# Patient Record
Sex: MALE | Race: WHITE | NOT HISPANIC OR LATINO | Employment: OTHER | URBAN - METROPOLITAN AREA
[De-identification: names, ages, dates, MRNs, and addresses within clinical notes are randomized per-mention and may not be internally consistent; named-entity substitution may affect disease eponyms.]

---

## 2017-10-09 RX ORDER — TAMSULOSIN HYDROCHLORIDE 0.4 MG/1
CAPSULE ORAL
Qty: 180 CAPSULE | Refills: 3 | Status: SHIPPED | OUTPATIENT
Start: 2017-10-09 | End: 2018-10-30 | Stop reason: SDUPTHER

## 2018-10-30 DIAGNOSIS — N13.8 BPH WITH URINARY OBSTRUCTION: Primary | ICD-10-CM

## 2018-10-30 DIAGNOSIS — N40.1 BPH WITH URINARY OBSTRUCTION: Primary | ICD-10-CM

## 2018-10-31 RX ORDER — TAMSULOSIN HYDROCHLORIDE 0.4 MG/1
CAPSULE ORAL
Qty: 180 CAPSULE | Refills: 3 | Status: SHIPPED | OUTPATIENT
Start: 2018-10-31

## 2019-12-03 ENCOUNTER — HOSPITAL ENCOUNTER (OUTPATIENT)
Facility: HOSPITAL | Age: 84
Discharge: NURSING, REHAB OR LONG-TERM CARE | End: 2019-12-05
Attending: EMERGENCY MEDICINE | Admitting: INTERNAL MEDICINE
Payer: MEDICARE

## 2019-12-03 DIAGNOSIS — K22.2 ESOPHAGEAL STRICTURE: Primary | ICD-10-CM

## 2019-12-03 DIAGNOSIS — R13.10 DYSPHAGIA: ICD-10-CM

## 2019-12-03 PROBLEM — R03.0 ELEVATED BLOOD PRESSURE READING: Status: ACTIVE | Noted: 2019-12-03

## 2019-12-03 LAB
ALBUMIN SERPL BCP-MCNC: 4.1 G/DL (ref 3.5–5.2)
ALP SERPL-CCNC: 72 U/L (ref 55–135)
ALT SERPL W/O P-5'-P-CCNC: 15 U/L (ref 10–44)
ANION GAP SERPL CALC-SCNC: 9 MMOL/L (ref 8–16)
AST SERPL-CCNC: 22 U/L (ref 10–40)
BASOPHILS # BLD AUTO: 0.03 K/UL (ref 0–0.2)
BASOPHILS NFR BLD: 0.5 % (ref 0–1.9)
BILIRUB SERPL-MCNC: 0.8 MG/DL (ref 0.1–1)
BUN SERPL-MCNC: 24 MG/DL (ref 10–30)
CALCIUM SERPL-MCNC: 9.5 MG/DL (ref 8.7–10.5)
CHLORIDE SERPL-SCNC: 104 MMOL/L (ref 95–110)
CO2 SERPL-SCNC: 26 MMOL/L (ref 23–29)
CREAT SERPL-MCNC: 1.1 MG/DL (ref 0.5–1.4)
DIFFERENTIAL METHOD: ABNORMAL
EOSINOPHIL # BLD AUTO: 0.3 K/UL (ref 0–0.5)
EOSINOPHIL NFR BLD: 5 % (ref 0–8)
ERYTHROCYTE [DISTWIDTH] IN BLOOD BY AUTOMATED COUNT: 14.1 % (ref 11.5–14.5)
EST. GFR  (AFRICAN AMERICAN): >60 ML/MIN/1.73 M^2
EST. GFR  (NON AFRICAN AMERICAN): 57.6 ML/MIN/1.73 M^2
GLUCOSE SERPL-MCNC: 100 MG/DL (ref 70–110)
HCT VFR BLD AUTO: 43.4 % (ref 40–54)
HGB BLD-MCNC: 14.1 G/DL (ref 14–18)
IMM GRANULOCYTES # BLD AUTO: 0.04 K/UL (ref 0–0.04)
IMM GRANULOCYTES NFR BLD AUTO: 0.7 % (ref 0–0.5)
LYMPHOCYTES # BLD AUTO: 1.5 K/UL (ref 1–4.8)
LYMPHOCYTES NFR BLD: 25 % (ref 18–48)
MCH RBC QN AUTO: 30.7 PG (ref 27–31)
MCHC RBC AUTO-ENTMCNC: 32.5 G/DL (ref 32–36)
MCV RBC AUTO: 94 FL (ref 82–98)
MONOCYTES # BLD AUTO: 0.7 K/UL (ref 0.3–1)
MONOCYTES NFR BLD: 11.3 % (ref 4–15)
NEUTROPHILS # BLD AUTO: 3.5 K/UL (ref 1.8–7.7)
NEUTROPHILS NFR BLD: 57.5 % (ref 38–73)
NRBC BLD-RTO: 0 /100 WBC
PLATELET # BLD AUTO: 140 K/UL (ref 150–350)
PMV BLD AUTO: 10.7 FL (ref 9.2–12.9)
POTASSIUM SERPL-SCNC: 4.1 MMOL/L (ref 3.5–5.1)
PROT SERPL-MCNC: 7.1 G/DL (ref 6–8.4)
RBC # BLD AUTO: 4.6 M/UL (ref 4.6–6.2)
SODIUM SERPL-SCNC: 139 MMOL/L (ref 136–145)
WBC # BLD AUTO: 6 K/UL (ref 3.9–12.7)

## 2019-12-03 PROCEDURE — 99900035 HC TECH TIME PER 15 MIN (STAT)

## 2019-12-03 PROCEDURE — 94761 N-INVAS EAR/PLS OXIMETRY MLT: CPT

## 2019-12-03 PROCEDURE — 27000221 HC OXYGEN, UP TO 24 HOURS

## 2019-12-03 PROCEDURE — 80053 COMPREHEN METABOLIC PANEL: CPT

## 2019-12-03 PROCEDURE — 85025 COMPLETE CBC W/AUTO DIFF WBC: CPT

## 2019-12-03 PROCEDURE — 99285 EMERGENCY DEPT VISIT HI MDM: CPT | Mod: 25

## 2019-12-03 PROCEDURE — 93005 ELECTROCARDIOGRAM TRACING: CPT

## 2019-12-03 PROCEDURE — G0378 HOSPITAL OBSERVATION PER HR: HCPCS

## 2019-12-03 RX ORDER — GLUCAGON 1 MG
1 KIT INJECTION
Status: DISCONTINUED | OUTPATIENT
Start: 2019-12-03 | End: 2019-12-05 | Stop reason: HOSPADM

## 2019-12-03 RX ORDER — PRIMIDONE 50 MG/1
TABLET ORAL
COMMUNITY
Start: 2018-02-28 | End: 2019-12-03

## 2019-12-03 RX ORDER — CLONAZEPAM 0.5 MG/1
0.5 TABLET ORAL 2 TIMES DAILY PRN
COMMUNITY
Start: 2019-09-23

## 2019-12-03 RX ORDER — TAMSULOSIN HYDROCHLORIDE 0.4 MG/1
0.8 CAPSULE ORAL
COMMUNITY
Start: 2016-08-29 | End: 2019-12-03 | Stop reason: SDUPTHER

## 2019-12-03 RX ORDER — OMEPRAZOLE 40 MG/1
40 CAPSULE, DELAYED RELEASE ORAL NIGHTLY
Status: ON HOLD | COMMUNITY
Start: 2019-09-12 | End: 2019-12-04 | Stop reason: SDUPTHER

## 2019-12-03 RX ORDER — IBUPROFEN 200 MG
16 TABLET ORAL
Status: DISCONTINUED | OUTPATIENT
Start: 2019-12-03 | End: 2019-12-05 | Stop reason: HOSPADM

## 2019-12-03 RX ORDER — HYDRALAZINE HYDROCHLORIDE 20 MG/ML
10 INJECTION INTRAMUSCULAR; INTRAVENOUS EVERY 8 HOURS PRN
Status: DISCONTINUED | OUTPATIENT
Start: 2019-12-03 | End: 2019-12-05 | Stop reason: HOSPADM

## 2019-12-03 RX ORDER — IBUPROFEN 200 MG
24 TABLET ORAL
Status: DISCONTINUED | OUTPATIENT
Start: 2019-12-03 | End: 2019-12-05 | Stop reason: HOSPADM

## 2019-12-03 RX ORDER — PANTOPRAZOLE SODIUM 40 MG/10ML
40 INJECTION, POWDER, LYOPHILIZED, FOR SOLUTION INTRAVENOUS DAILY
Status: DISCONTINUED | OUTPATIENT
Start: 2019-12-04 | End: 2019-12-05 | Stop reason: HOSPADM

## 2019-12-03 RX ORDER — SODIUM CHLORIDE 0.9 % (FLUSH) 0.9 %
10 SYRINGE (ML) INJECTION
Status: DISCONTINUED | OUTPATIENT
Start: 2019-12-03 | End: 2019-12-05 | Stop reason: HOSPADM

## 2019-12-03 NOTE — ED TRIAGE NOTES
Pt was schedule for outpatient EGD and Dilation on Thursday. Pt was told to come to ER if he felt like he could not wait. Pt states that he feels like something is in his throat and he is unable to take in anything PO.

## 2019-12-03 NOTE — HOSPITAL COURSE
In the ER cbc is wnl, chemistry is pending  CXR is normal  BP elevated- hydralazine IVP is ordered

## 2019-12-03 NOTE — PROGRESS NOTES
Pt in the ER with dysphagia and dehydration.  Pt to see me on Thursday but given ongoing issues and dehydration I have advised admission and egd tomorrow with mac.  Please keep npo p mn for scope tomorrow am

## 2019-12-03 NOTE — ED NOTES
Patient identifiers for Jd Severino checked and correct.  LOC: Patient is awake, alert, and aware of environment with an appropriate affect. Patient is oriented x 3 and state he has a problem with his speech which is chronic.  APPEARANCE: Patient resting comfortably and in no acute distress. Patient is clean and well groomed, patient's clothing is properly fastened.  SKIN: The skin is warm and dry. Patient has poor skin turgor. Skin is intact; no bruising or breakdown noted.  MUSKULOSKELETAL: Patient is moving all extremities well, no obvious deformities noted. Pulses intact. States walks without assistance most of the time , caregiver states pt still drives a vehicle.  RESPIRATORY: Airway is open and patent. Respirations are spontaneous and non-labored with normal effort and rate.  CARDIAC: Patient has a normal rate and rhythm. No peripheral edema noted. Capillary refill < 3 seconds.  ABDOMEN: No distention noted. Bowel sounds active in all 4 quadrants. Soft and non-tender upon palpation.  NEUROLOGICAL: PERRL. Facial expression is symmetrical. Hand grasps are equal bilaterally. Normal sensation in all extremities when touched with finger  States is having difficulty with swallowing , worsening today.  Allergies reported:   Review of patient's allergies indicates:   Allergen Reactions    Corticosteroids (glucocorticoids) Other (See Comments)     Dex prashant...weakness. Unable to urinate

## 2019-12-03 NOTE — ED PROVIDER NOTES
Encounter Date: 12/3/2019       History     Chief Complaint   Patient presents with    EGD     pt here for emergent EGD and Dilation    Foreign Body In Throat     89-year-old male who has a prior history of lung cancer, GERD, prostatic hypertrophy, is sent to the emergency room from Dr. Durham's office  with complaints of difficulty swallowing.  Patient was scheduled to have had and EGD and esophageal dilatation in 2 days but was advised that if his symptoms became much worse to report to this emergency room today for an earlier procedure.  The patient had told nursing personnel that he had an inability to handle his own secretions.  Patient has a history of some vocal cord problems for review years and has difficulty with some inspiratory and expiratory grunting with respiration.  No history of any chest pain. He had no history of any weight loss.  No complaints of any changes in ventilatory status.  No complaints of any thoracic back pain. No fever or chills.        Review of patient's allergies indicates:   Allergen Reactions    Corticosteroids (glucocorticoids) Other (See Comments)     Dex prashant...weakness. Unable to urinate     Past Medical History:   Diagnosis Date    GERD (gastroesophageal reflux disease)     History of elevated PSA     Hypertrophy of prostate with urinary obstruction and other lower urinary tract symptoms (LUTS)     Immune disorder     Retention of urine, unspecified     Vocal cord anomaly      No past surgical history on file.  Family History   Problem Relation Age of Onset    Lung cancer Father      Social History     Tobacco Use    Smoking status: Never Smoker   Substance Use Topics    Alcohol use: Not on file    Drug use: Not on file     Review of Systems   Constitutional: Positive for appetite change. Negative for chills, diaphoresis, fatigue and fever.   HENT: Positive for trouble swallowing. Negative for congestion, drooling, ear pain, mouth sores, rhinorrhea, sinus pain  and sore throat.    Eyes: Negative for photophobia, pain and redness.   Respiratory: Negative for cough, chest tightness, shortness of breath, wheezing and stridor.    Cardiovascular: Negative for chest pain and palpitations.   Gastrointestinal: Negative for abdominal pain, constipation, diarrhea, nausea and vomiting.   Genitourinary: Negative for difficulty urinating, dysuria, flank pain, frequency and hematuria.   Musculoskeletal: Negative for arthralgias, back pain and neck pain.   Skin: Negative for pallor, rash and wound.   Neurological: Negative for headaches.   All other systems reviewed and are negative.      Physical Exam     Initial Vitals [12/03/19 1441]   BP Pulse Resp Temp SpO2   (!) 177/82 89 (!) 24 98.8 °F (37.1 °C) 95 %      MAP       --         Physical Exam    Constitutional: He appears well-developed and well-nourished. He is not diaphoretic. No distress.   HENT:   Head: Normocephalic and atraumatic.   Nose: Nose normal.   Mouth/Throat: Oropharynx is clear and moist.   Eyes: Conjunctivae and EOM are normal. Pupils are equal, round, and reactive to light. Right eye exhibits no discharge. Left eye exhibits no discharge. No scleral icterus.   Neck: Normal range of motion. Neck supple. No tracheal deviation present. No JVD present.   Cardiovascular: Normal rate, regular rhythm, normal heart sounds and intact distal pulses. Exam reveals no gallop and no friction rub.    No murmur heard.  Pulmonary/Chest: Breath sounds normal. No respiratory distress. He has no wheezes. He has no rhonchi. He has no rales.   Abdominal: Soft. Bowel sounds are normal. He exhibits no distension. There is no tenderness. There is no rebound and no guarding.   Musculoskeletal: Normal range of motion. He exhibits no edema or tenderness.   Lymphadenopathy:     He has no cervical adenopathy.   Neurological: He is alert and oriented to person, place, and time. He has normal strength. No cranial nerve deficit or sensory deficit.  GCS score is 15. GCS eye subscore is 4. GCS verbal subscore is 5. GCS motor subscore is 6.   Skin: Skin is warm and dry. Capillary refill takes less than 2 seconds. No rash noted. No erythema. No pallor.   Psychiatric: He has a normal mood and affect. His behavior is normal. Judgment and thought content normal.         ED Course   Procedures  Labs Reviewed   CBC W/ AUTO DIFFERENTIAL   COMPREHENSIVE METABOLIC PANEL          Imaging Results          X-Ray Chest AP Portable (In process)                               Attending Attestation:             Attending ED Notes:   This 89-year-old male who presented with some dysphagia, at the present time is handling his own secretions.  He was given a small amount of water to sip see whether not he was able to tolerate drinking at and he did so but with extreme difficulty.  During the ED course I did speak to Dr. Henriquez who recommended the patient be admitted now for an EGD in the a.m.                        Clinical Impression:       ICD-10-CM ICD-9-CM   1. Dysphagia R13.10 787.20   2.  Esophageal stricture                          Jasvir Gregg Jr., MD  12/03/19 0614

## 2019-12-03 NOTE — HPI
Patient presented to the ED with complaint of inability to manage his own oral secretions.  He was seen in the office by gastroenterologist who had scheduled him for esophageal dilation this week in the next two days  However he was not able to tolerate swallowing his own secretions and presented to the ER  He was evaluated in the ED by the gastroenterologist who recommended patient be admitted for EGD in am  The patient states that he comes from an [facility] tonight because he was having pain on swallowing and could not even pass liquids  He has a history of vocal cord dysfunction which causes him to have to gasp for air; he has had this as a result of some neurologic dysfunction from many years ago.  He previously had dilation a few years ago once by dr. Ochoa  Denied choking but feels he is not sure if food went down the wrong way    ROS: a ten point ros with pertinent positives and negatives in hpi, otherwise negative    Past Medical History:   Diagnosis Date    GERD (gastroesophageal reflux disease)     History of elevated PSA     Hypertrophy of prostate with urinary obstruction and other lower urinary tract symptoms (LUTS)     Immune disorder     Retention of urine, unspecified     Vocal cord anomaly       PAST SURGICAL HISTORY: esophageal dilation by Dr. Ochoa    Social History     Socioeconomic History    Marital status:      Spouse name: Not on file    Number of children: Not on file    Years of education: Not on file    Highest education level: Not on file   Occupational History    Not on file   Social Needs    Financial resource strain: Not on file    Food insecurity:     Worry: Not on file     Inability: Not on file    Transportation needs:     Medical: Not on file     Non-medical: Not on file   Tobacco Use    Smoking status: Never Smoker   Substance and Sexual Activity    Alcohol use: Not on file    Drug use: Not on file    Sexual activity: Not on file   Lifestyle    Physical  activity:     Days per week: Not on file     Minutes per session: Not on file    Stress: Not on file   Relationships    Social connections:     Talks on phone: Not on file     Gets together: Not on file     Attends Anglican service: Not on file     Active member of club or organization: Not on file     Attends meetings of clubs or organizations: Not on file     Relationship status: Not on file   Other Topics Concern    Not on file   Social History Narrative    Not on file     Family History   Problem Relation Age of Onset    Lung cancer Father

## 2019-12-03 NOTE — H&P
[facility] Medicine  History & Physical    Patient Name: Jd Severino  MRN: [id number]  Admission Date: 12/3/2019  Attending Physician: Efraín Lloyd MD    Primary Care Provider: Kieran Durham MD         Patient information was obtained from patient and ER records.     Subjective:     Principal Problem:difficulty swallowing own secretions    Chief Complaint:   Chief Complaint   Patient presents with    EGD     pt here for emergent EGD and Dilation    Foreign Body In Throat        HPI:   Patient presented to the ED with complaint of inability to manage his own oral secretions.  He was seen in the office by gastroenterologist who had scheduled him for esophageal dilation this week in the next two days  However he was not able to tolerate swallowing his own secretions and presented to the ER  He was evaluated in the ED by the gastroenterologist who recommended patient be admitted for EGD in am  The patient states that he comes from an [facility] tonight because he was having pain on swallowing and could not even pass liquids  He has a history of vocal cord dysfunction which causes him to have to gasp for air; he has had this as a result of some neurologic dysfunction from many years ago.  He previously had dilation a few years ago once by dr. Ochoa  Denied choking but feels he is not sure if food went down the wrong way    ROS: a ten point ros with pertinent positives and negatives in hpi, otherwise negative    Past Medical History:   Diagnosis Date    GERD (gastroesophageal reflux disease)     History of elevated PSA     Hypertrophy of prostate with urinary obstruction and other lower urinary tract symptoms (LUTS)     Immune disorder     Retention of urine, unspecified     Vocal cord anomaly       PAST SURGICAL HISTORY: esophageal dilation by Dr. Ochoa    Social History     Socioeconomic History    Marital status:      Spouse name: Not on file    Number of children: Not  on file    Years of education: Not on file    Highest education level: Not on file   Occupational History    Not on file   Social Needs    Financial resource strain: Not on file    Food insecurity:     Worry: Not on file     Inability: Not on file    Transportation needs:     Medical: Not on file     Non-medical: Not on file   Tobacco Use    Smoking status: Never Smoker   Substance and Sexual Activity    Alcohol use: Not on file    Drug use: Not on file    Sexual activity: Not on file   Lifestyle    Physical activity:     Days per week: Not on file     Minutes per session: Not on file    Stress: Not on file   Relationships    Social connections:     Talks on phone: Not on file     Gets together: Not on file     Attends Oriental orthodox service: Not on file     Active member of club or organization: Not on file     Attends meetings of clubs or organizations: Not on file     Relationship status: Not on file   Other Topics Concern    Not on file   Social History Narrative    Not on file     Family History   Problem Relation Age of Onset    Lung cancer Father          Vitals:    12/03/19 1630   BP: (!) 189/86   Pulse: 81   Resp: (!) 24   Temp:    oxygen saturation 92-98% on room air     Gen: ao, nad  HEENT: ncat; inspiratory stridor with stressed phonation on inspiration at baseline; lips look slightly dusky  CAR: nl s1s2  LUNGS: ctabl  ABD: ntnd   EXT: nl rom throughout  SKIN: warm+dry  NEURO: cn 2-12 grossly intact    Assessment/Plan:     Elevated blood pressure reading  Hydralazine prn  Monitor bp  Is not on bp medication   Needs outpatient evaluation      Esophageal stricture  Npo  EGD in am  GI consulted  - possible dc post procedure  - monitor on pulse ox      Vocal cord palsy  - monitor respiratory status      BPH (benign prostatic hypertrophy) with urinary obstruction  Monitor for urine retention      VTE Risk Mitigation (From admission, onward)         Ordered     Place ORLANDO hose  Until discontinued       12/03/19 1719     IP VTE HIGH RISK PATIENT  Once      12/03/19 1719                   Efraín Lloyd MD  Department of Hospital Medicine   [facility]

## 2019-12-04 ENCOUNTER — ANESTHESIA (OUTPATIENT)
Dept: SURGERY | Facility: HOSPITAL | Age: 84
End: 2019-12-04
Payer: MEDICARE

## 2019-12-04 ENCOUNTER — ANESTHESIA EVENT (OUTPATIENT)
Dept: SURGERY | Facility: HOSPITAL | Age: 84
End: 2019-12-04
Payer: MEDICARE

## 2019-12-04 PROBLEM — R03.0 ELEVATED BLOOD PRESSURE READING: Status: RESOLVED | Noted: 2019-12-03 | Resolved: 2019-12-04

## 2019-12-04 PROBLEM — K22.2 ESOPHAGEAL STRICTURE: Status: RESOLVED | Noted: 2019-12-03 | Resolved: 2019-12-04

## 2019-12-04 PROCEDURE — 37000008 HC ANESTHESIA 1ST 15 MINUTES: Performed by: INTERNAL MEDICINE

## 2019-12-04 PROCEDURE — 43239 EGD BIOPSY SINGLE/MULTIPLE: CPT | Mod: 59 | Performed by: INTERNAL MEDICINE

## 2019-12-04 PROCEDURE — 63600175 PHARM REV CODE 636 W HCPCS: Performed by: NURSE ANESTHETIST, CERTIFIED REGISTERED

## 2019-12-04 PROCEDURE — 88312 SPECIAL STAINS GROUP 1: CPT | Mod: TC

## 2019-12-04 PROCEDURE — 27000014 HC INFLATION DEVICE: Performed by: INTERNAL MEDICINE

## 2019-12-04 PROCEDURE — 88305 TISSUE EXAM BY PATHOLOGIST: CPT | Mod: TC,59

## 2019-12-04 PROCEDURE — 37000009 HC ANESTHESIA EA ADD 15 MINS: Performed by: INTERNAL MEDICINE

## 2019-12-04 PROCEDURE — 94761 N-INVAS EAR/PLS OXIMETRY MLT: CPT

## 2019-12-04 PROCEDURE — 27200043 HC FORCEPS, BIOPSY: Performed by: INTERNAL MEDICINE

## 2019-12-04 PROCEDURE — G0378 HOSPITAL OBSERVATION PER HR: HCPCS

## 2019-12-04 PROCEDURE — C1726 CATH, BAL DIL, NON-VASCULAR: HCPCS | Performed by: INTERNAL MEDICINE

## 2019-12-04 PROCEDURE — 43249 ESOPH EGD DILATION <30 MM: CPT | Performed by: INTERNAL MEDICINE

## 2019-12-04 PROCEDURE — 25000003 PHARM REV CODE 250: Performed by: INTERNAL MEDICINE

## 2019-12-04 RX ORDER — PROPOFOL 10 MG/ML
VIAL (ML) INTRAVENOUS
Status: DISCONTINUED | OUTPATIENT
Start: 2019-12-04 | End: 2019-12-04

## 2019-12-04 RX ORDER — AMLODIPINE BESYLATE 5 MG/1
5 TABLET ORAL DAILY
Qty: 30 TABLET | Refills: 0 | Status: SHIPPED | OUTPATIENT
Start: 2019-12-04

## 2019-12-04 RX ORDER — SUCRALFATE 1 G/10ML
1 SUSPENSION ORAL EVERY 6 HOURS
Status: DISCONTINUED | OUTPATIENT
Start: 2019-12-04 | End: 2019-12-05 | Stop reason: HOSPADM

## 2019-12-04 RX ORDER — OMEPRAZOLE 40 MG/1
40 CAPSULE, DELAYED RELEASE ORAL
Qty: 60 CAPSULE | Refills: 0 | Status: SHIPPED | OUTPATIENT
Start: 2019-12-04

## 2019-12-04 RX ORDER — CLONAZEPAM 0.5 MG/1
0.5 TABLET ORAL 2 TIMES DAILY PRN
Status: DISCONTINUED | OUTPATIENT
Start: 2019-12-04 | End: 2019-12-05 | Stop reason: HOSPADM

## 2019-12-04 RX ORDER — SODIUM CHLORIDE 9 MG/ML
INJECTION, SOLUTION INTRAVENOUS CONTINUOUS PRN
Status: DISCONTINUED | OUTPATIENT
Start: 2019-12-04 | End: 2019-12-04

## 2019-12-04 RX ORDER — SUCRALFATE 1 G/10ML
1 SUSPENSION ORAL EVERY 6 HOURS
Qty: 414 ML | Refills: 0 | Status: SHIPPED | OUTPATIENT
Start: 2019-12-04

## 2019-12-04 RX ADMIN — PROPOFOL 10 MG: 10 INJECTION, EMULSION INTRAVENOUS at 10:12

## 2019-12-04 RX ADMIN — PROPOFOL 5 MG: 10 INJECTION, EMULSION INTRAVENOUS at 10:12

## 2019-12-04 RX ADMIN — SODIUM CHLORIDE: 900 INJECTION, SOLUTION INTRAVENOUS at 10:12

## 2019-12-04 RX ADMIN — PROPOFOL 15 MG: 10 INJECTION, EMULSION INTRAVENOUS at 10:12

## 2019-12-04 RX ADMIN — SUCRALFATE 1 G: 1 SUSPENSION ORAL at 06:12

## 2019-12-04 NOTE — PROGRESS NOTES
"[facility] Medicine  Progress Note    Patient Name: Jd Severino  MRN: [id number]  Patient Class: OP- Observation   Admission Date: 12/3/2019  Attending Physician: Yvonne Hollis MD  Primary Care Provider: Kieran Durham MD    Subjective:     Principal Problem:Esophageal stricture    HPI:    Patient presented to the ED with complaint of inability to manage his own oral secretions.  He was seen in the office by gastroenterologist who had scheduled him for esophageal dilation this week in the next two days  However he was not able to tolerate swallowing his own secretions and presented to the ER  He was evaluated in the ED by the gastroenterologist who recommended patient be admitted for EGD in am  The patient states [facility] because he was having pain on swallowing and could not even pass liquids  He has a history of vocal cord dysfunction which causes him to have to gasp for air; he has had this as a result of some neurologic dysfunction from many years ago.  He previously had dilation a few years ago once by dr. Ochoa  Denied choking but feels he is not sure if food went down the wrong way    ROS: a ten point ros with pertinent positives and negatives in hpi, otherwise negative    Interval History:    12/4: pt seen after EGD with dilation.  Pt reports his breathing is not back to baseline after procedure today.  Nursing reports pt having episodes of confusion, and now having repeated episodes of dysphagia.  Nursing reports that pt hasn't been getting soft diet as per GI recs but has been receiving regular food.  Discharge held because of these issues developing.       Review of Systems     All systems reviewed and are negative except as noted per above.    Objective:      BP (!) 156/76   Pulse 79   Temp 98 °F (36.7 °C) (Oral)   Resp 20   Ht 6' 4" (1.93 m)   Wt 84.6 kg (186 lb 8.2 oz)   SpO2 97%   BMI 22.70 kg/m²     Physical Exam  Gen: alert, " responsive during my exam  HEENT:  Eyes - no pallor  External ears with no lesions  Nares patent  Mouth - lips chapped  CV: RRR  Lungs: CTA B/L, chronic stridor 2/2 vocal cord palsy   Abd: +BS, soft, NT, ND  Ext: no atrophy or edema  Skin: warm, dry  Neuro: grossly intact  Psych: pleasant    All labs, images, and other studies reviewed personally by me.      Assessment/Plan:      Patient Active Problem List   Diagnosis    BPH (benign prostatic hypertrophy) with urinary obstruction    Abnormal PSA    Mucous membrane pemphigoid    Spasmodic dysphonia    Benign prostatic hyperplasia    Essential tremor    Gastroesophageal reflux disease    Multiple pulmonary nodules    Vocal cord palsy     Esophageal stricture  - EGD today  - pt with continued SOB after procedure  - pt with continued confusion after procedure  - holding discharge for now  - pt wasn't given soft diet after procedure and reports repeat dysphagia  - pt made NPO for now with soft diet to resume in AM  - GI following, thank you    Vocal cord palsy with chronic stridor  - continuous pulse oximetry    BPH (benign prostatic hypertrophy) with urinary obstruction  Monitor for urine retention    Other chronic conditions: home Rx meds    VTE Risk Mitigation (From admission, onward)         Ordered     Place [place] hose  Until discontinued      12/03/19 1719     IP VTE HIGH RISK PATIENT  Once      12/03/19 1719                Yvonne Hollis MD  Department of Hospital Medicine   [facility]

## 2019-12-04 NOTE — SUBJECTIVE & OBJECTIVE
Interval History:       Review of Systems     All systems reviewed and are negative except as noted per above.    Objective:        Physical Exam

## 2019-12-04 NOTE — DISCHARGE INSTRUCTIONS
ONLY EAT SOFT FOODS FOR THE NEXT WEEK.  PLEASE TAKE NEW MEDICATIONS AS PRESCRIBED -- SEE YOUR MEDICATION LIST.

## 2019-12-04 NOTE — OP NOTE
EGD  Schatzki ring dilated to 20mmHg, biopsies of ring.   Erosive ulcerated esophagitis    Recs  -soft diet x 1 week  -ppi bid x 4 weeks  -carafate suspension qid x 2 weeks  -follow up in clinic in 3-4 weeks  -okay with d/c home today

## 2019-12-04 NOTE — ANESTHESIA PREPROCEDURE EVALUATION
12/04/2019  Jd Severino is a 89 y.o., male.    Patient Active Problem List   Diagnosis    BPH (benign prostatic hypertrophy) with urinary obstruction    Abnormal PSA    Mucous membrane pemphigoid    Spasmodic dysphonia    Benign prostatic hyperplasia    Essential tremor    Gastroesophageal reflux disease    Multiple pulmonary nodules    Vocal cord palsy    Esophageal stricture    Elevated blood pressure reading       History reviewed. No pertinent surgical history.     Tobacco Use:  The patient  reports that he has never smoked. He does not have any smokeless tobacco history on file.     Results for orders placed or performed during the hospital encounter of 12/03/19   EKG 12-lead    Collection Time: 12/03/19  6:18 PM    Narrative    Test Reason : R13.10,    Vent. Rate : 080 BPM     Atrial Rate : 080 BPM     P-R Int : 230 ms          QRS Dur : 082 ms      QT Int : 392 ms       P-R-T Axes : 051 058 069 degrees     QTc Int : 452 ms    Sinus rhythm with 1st degree A-V block with Premature supraventricular  complexes  Otherwise normal ECG  No previous ECGs available  Confirmed by Shahzad Damon MD ([id number]) on 12/4/2019 9:00:54 AM    Referred By: AKIRA   SELF           Confirmed By:Shahzad Damon MD        Imaging Results          X-Ray Chest AP Portable (Final result)  Result time 12/03/19 17:11:17    Final result by Shiv Gomes MD (12/03/19 17:11:17)                 Impression:      1. No acute radiographic abnormalities.      Electronically signed by: Shiv Gomes MD  Date:    12/03/2019  Time:    17:11             Narrative:    EXAMINATION:  XR CHEST AP PORTABLE    CLINICAL HISTORY:  Foreign body    COMPARISON:  None.    FINDINGS:  Heart size is normal.  The mediastinum is unremarkable.  No infiltrates or effusions are identified.  Calcified granulomas are noted at both  lung bases.    No radiopaque ingested foreign bodies are identified.                                 Lab Results   Component Value Date    WBC 6.00 12/03/2019    HGB 14.1 12/03/2019    HCT 43.4 12/03/2019    MCV 94 12/03/2019     (L) 12/03/2019     BMP  Lab Results   Component Value Date     12/03/2019    K 4.1 12/03/2019     12/03/2019    CO2 26 12/03/2019    BUN 24 12/03/2019    CREATININE 1.1 12/03/2019    CALCIUM 9.5 12/03/2019    ANIONGAP 9 12/03/2019    ESTGFRAFRICA >60.0 12/03/2019    EGFRNONAA 57.6 (A) 12/03/2019             Anesthesia Evaluation    I have reviewed the Patient Summary Reports.     I have reviewed the Medications.     Review of Systems  Anesthesia Hx:  No problems with previous Anesthesia Denies Hx of Anesthetic complications  Denies Family Hx of Anesthesia complications.   Denies Personal Hx of Anesthesia complications.   Hematology/Oncology:  Hematology Normal   Oncology Normal     EENT/Dental:EENT/Dental Normal   Cardiovascular:  Cardiovascular Normal     Pulmonary:  Pulmonary Normal    Renal/:  Renal/ Normal     Hepatic/GI:   GERD    Musculoskeletal:  Musculoskeletal Normal    Neurological:  Neurology Normal    Endocrine:  Endocrine Normal    Psych:  Psychiatric Normal           Physical Exam  General:  Well nourished    Airway/Jaw/Neck:  Airway Findings: Mouth Opening: Normal Tongue: Normal  General Airway Assessment: Adult  Mallampati: II  TM Distance: Normal, at least 6 cm  Jaw/Neck Findings:  Neck ROM: Normal ROM      Dental:  Dental Findings:   Chest/Lungs:  Chest/Lungs Findings: Clear to auscultation, Normal Respiratory Rate     Heart/Vascular:  Heart Findings: Rate: Normal  Rhythm: Regular Rhythm  Sounds: Normal        Mental Status:  Mental Status Findings:  Alert and Oriented         Anesthesia Plan  Type of Anesthesia, risks & benefits discussed:  Anesthesia Type:  MAC  Patient's Preference:   Intra-op Monitoring Plan: standard ASA monitors  Intra-op  Monitoring Plan Comments:   Post Op Pain Control Plan:   Post Op Pain Control Plan Comments:   Induction:    Beta Blocker:  Patient is not currently on a Beta-Blocker (No further documentation required).       Informed Consent: Patient understands risks and agrees with Anesthesia plan.  Questions answered. Anesthesia consent signed with patient.  ASA Score: 2     Day of Surgery Review of History & Physical:            Ready For Surgery From Anesthesia Perspective.

## 2019-12-04 NOTE — CONSULTS
Chief Complaint:  dysphagia    HPI:  89-year-old male with history of chronic intermittent dysphagia progressive over the past few months associated with solid foods primarily located at that site voice process.  Patient admitted from the ER for evaluation today due to dehydration.  Patient feels well.  Last meal was about 2 days ago.     EGD 4/2016 ([facility])- esophagitis, distal ring and hernia.  Dilated to 54 [place]    Review of Systems:  Constitutional: No fever, weight loss  Eyes: No vision problems  ENT: No hearing problems, dysphagia  Cardiovascular: No chest pain or palpitations  Respiratory: No breathing problems or cough  GI: No diarrhea or constipation +dysphagia  LAMIN: No urinary symptoms  Neurologic: No tremor or headaches  Musculoskeletal: No weakness or pain  Integumentary: no rashes or lesions  Psychiatric: no depression or anxiety  Complete ROS performed and negative unless stated above in HPI    Past Medical History:   Diagnosis Date    GERD (gastroesophageal reflux disease)     History of elevated PSA     Hypertrophy of prostate with urinary obstruction and other lower urinary tract symptoms (LUTS)     Immune disorder     Retention of urine, unspecified     Vocal cord anomaly        History reviewed. No pertinent surgical history.    Family History   Problem Relation Age of Onset    Lung cancer Father        Social History     Socioeconomic History    Marital status:      Spouse name: Not on file    Number of children: Not on file    Years of education: Not on file    Highest education level: Not on file   Occupational History    Not on file   Social Needs    Financial resource strain: Not on file    Food insecurity:     Worry: Not on file     Inability: Not on file    Transportation needs:     Medical: Not on file     Non-medical: Not on file   Tobacco Use    Smoking status: Never Smoker   Substance and Sexual Activity    Alcohol use: Never     Frequency: Never    Drug  "use: Not on file    Sexual activity: Not on file   Lifestyle    Physical activity:     Days per week: Not on file     Minutes per session: Not on file    Stress: Not on file   Relationships    Social connections:     Talks on phone: Not on file     Gets together: Not on file     Attends Taoism service: Not on file     Active member of club or organization: Not on file     Attends meetings of clubs or organizations: Not on file     Relationship status: Not on file   Other Topics Concern    Not on file   Social History Narrative    Not on file       Review of patient's allergies indicates:   Allergen Reactions    Corticosteroids (glucocorticoids) Other (See Comments)     Dex prashant...weakness. Unable to urinate       No current facility-administered medications on file prior to encounter.      Current Outpatient Medications on File Prior to Encounter   Medication Sig Dispense Refill    clonazePAM (KLONOPIN) 0.5 MG tablet Take 0.5 mg by mouth 2 (two) times daily as needed.       omeprazole (PRILOSEC) 40 MG capsule Take 40 mg by mouth every evening.       propylene glycol (SYSTANE BALANCE) 0.6 % Drop Apply 2 drops to eye once daily.      tamsulosin (FLOMAX) 0.4 mg Cap TAKE 2 CAPSULES EVERY EVENING AT NIGHT (Patient taking differently: Take 0.4 mg by mouth every evening. TAKE 2 CAPSULES EVERY EVENING AT NIGHT) 180 capsule 3       Objective:  BP (!) 167/87   Pulse 85   Temp 98.3 °F (36.8 °C)   Resp (!) 22   Ht 6' 4" (1.93 m)   Wt 84.6 kg (186 lb 8.2 oz)   SpO2 97%   BMI 22.70 kg/m²   General: wd, wn, nad  HE: ncat, perrl, eomi  ENT: op pink and moist without lesions or exudates  CV: +s1s2, no mrg, rrr  Resp: ctapb, no wrr  GI: bs active, abd soft, nt, nd  Skin: no lesions, no rash  Neuro: cn 2-12 in tact, no focal deficits, no asterixis  Psych: regular rate speech, normal affect    Labs:  Recent Results (from the past 2688 hour(s))   CBC auto differential    Collection Time: 12/03/19  4:50 PM   Result " Value Ref Range    WBC 6.00 3.90 - 12.70 K/uL    RBC 4.60 4.60 - 6.20 M/uL    Hemoglobin 14.1 14.0 - 18.0 g/dL    Hematocrit 43.4 40.0 - 54.0 %    Mean Corpuscular Volume 94 82 - 98 fL    Mean Corpuscular Hemoglobin 30.7 27.0 - 31.0 pg    Mean Corpuscular Hemoglobin Conc 32.5 32.0 - 36.0 g/dL    RDW 14.1 11.5 - 14.5 %    Platelets 140 (L) 150 - 350 K/uL    MPV 10.7 9.2 - 12.9 fL    Immature Granulocytes 0.7 (H) 0.0 - 0.5 %    Gran # (ANC) 3.5 1.8 - 7.7 K/uL    Immature Grans (Abs) 0.04 0.00 - 0.04 K/uL    Lymph # 1.5 1.0 - 4.8 K/uL    Mono # 0.7 0.3 - 1.0 K/uL    Eos # 0.3 0.0 - 0.5 K/uL    Baso # 0.03 0.00 - 0.20 K/uL    nRBC 0 0 /100 WBC    Gran% 57.5 38.0 - 73.0 %    Lymph% 25.0 18.0 - 48.0 %    Mono% 11.3 4.0 - 15.0 %    Eosinophil% 5.0 0.0 - 8.0 %    Basophil% 0.5 0.0 - 1.9 %    Differential Method Automated    Comprehensive metabolic panel    Collection Time: 12/03/19  4:50 PM   Result Value Ref Range    Sodium 139 136 - 145 mmol/L    Potassium 4.1 3.5 - 5.1 mmol/L    Chloride 104 95 - 110 mmol/L    CO2 26 23 - 29 mmol/L    Glucose 100 70 - 110 mg/dL    BUN, Bld 24 10 - 30 mg/dL    Creatinine 1.1 0.5 - 1.4 mg/dL    Calcium 9.5 8.7 - 10.5 mg/dL    Total Protein 7.1 6.0 - 8.4 g/dL    Albumin 4.1 3.5 - 5.2 g/dL    Total Bilirubin 0.8 0.1 - 1.0 mg/dL    Alkaline Phosphatase 72 55 - 135 U/L    AST 22 10 - 40 U/L    ALT 15 10 - 44 U/L    Anion Gap 9 8 - 16 mmol/L    eGFR if African American >60.0 >60 mL/min/1.73 m^2    eGFR if non  57.6 (A) >60 mL/min/1.73 m^2          Assessment:  Dysphagia  History of ring    Plan:  EGD with dilation now

## 2019-12-04 NOTE — TRANSFER OF CARE
"Anesthesia Transfer of Care Note    Patient: Jd Severino    Procedure(s) Performed: Procedure(s) (LRB):  EGD (ESOPHAGOGASTRODUODENOSCOPY) (Left)    Patient location: GI    Anesthesia Type: MAC    Post pain: adequate analgesia    Post assessment: no apparent anesthetic complications    Post vital signs: stable    Level of consciousness: awake and alert    Nausea/Vomiting: no nausea/vomiting    Complications: none    Transfer of care protocol was followed      Last vitals:   Visit Vitals  BP (!) 167/87   Pulse 85   Temp 36.8 °C (98.3 °F)   Resp (!) 22   Ht 6' 4" (1.93 m)   Wt 84.6 kg (186 lb 8.2 oz)   SpO2 97%   BMI 22.70 kg/m²     "

## 2019-12-04 NOTE — [employer]OVATION PATIENT INSTRUCTIONS
Discharge Summary/Instructions after an Endoscopic Procedure  Patient Name: Jd Severino  Patient MRN: [id number]  Patient YOB: 1926  Wednesday, December 04, 2019  Prosper Henriquez MD  RESTRICTIONS:  During your procedure today, you received medications for sedation.  These   medications may affect your judgment, balance and coordination.  Therefore,   for 24 hours, you have the following restrictions:   - DO NOT drive a car, operate machinery, make legal/financial decisions,   sign important papers or drink alcohol.    ACTIVITY:  Today: no heavy lifting, straining or running due to procedural   sedation/anesthesia.  The following day: return to full activity including work.  DIET:  Eat and drink normally unless instructed otherwise.     TREATMENT FOR COMMON SIDE EFFECTS:  - Mild abdominal pain, nausea, belching, bloating or excessive gas:  rest,   eat lightly and use a heating pad.  - Sore Throat: treat with throat lozenges and/or gargle with warm salt   water.  - Because air was used during the procedure, expelling large amounts of air   from your rectum or belching is normal.  - If a bowel prep was taken, you may not have a bowel movement for 1-3 days.    This is normal.  SYMPTOMS TO WATCH FOR AND REPORT TO YOUR PHYSICIAN:  1. Abdominal pain or bloating, other than gas cramps.  2. Chest pain.  3. Back pain.  4. Signs of infection such as: chills or fever occurring within 24 hours   after the procedure.  5. Rectal bleeding, which would show as bright red, maroon, or black stools.   (A tablespoon of blood from the rectum is not serious, especially if   hemorrhoids are present.)  6. Vomiting.  7. Weakness or dizziness.  GO DIRECTLY TO THE NEAREST EMERGENCY ROOM IF YOU HAVE ANY OF THE FOLLOWING:      Difficulty breathing              Chills and/or fever over 101 F   Persistent vomiting and/or vomiting blood   Severe abdominal pain   Severe chest pain   Black, tarry stools   Bleeding- more than one  tablespoon   Any other symptom or condition that you feel may need urgent attention  Your doctor recommends these additional instructions:  If any biopsies were taken, your doctors clinic will contact you in 1 to 2   weeks with any results.  - Patient has a contact number available for emergencies.  The signs and   symptoms of potential delayed complications were discussed with the   patient.  Return to normal activities tomorrow.  Written discharge   instructions were provided to the patient.   - Resume previous diet.   - Continue present medications.   - Await pathology results.   - Repeat upper endoscopy PRN for retreatment.   - Return to GI clinic in 4 weeks.  - PPI BID x 1 month then daily  - Carafate suspension qid x 2 weeks   - [facility] patient to [facility] for [facility] care.  For questions, problems or results please call your physician - Prosper Henriquez MD at Work:  [phone].  [facility], EMERGENCY ROOM PHONE NUMBER: [phone]  IF A COMPLICATION OR EMERGENCY SITUATION ARISES AND YOU ARE UNABLE TO REACH   YOUR PHYSICIAN - GO DIRECTLY TO THE EMERGENCY ROOM.  MD Prosper Tirado MD  12/4/2019 10:51:34 AM  This report has been verified and signed electronically.  [employer]

## 2019-12-04 NOTE — ANESTHESIA POSTPROCEDURE EVALUATION
Anesthesia Post Evaluation    Patient: Jd Severino    Procedure(s) Performed: Procedure(s) (LRB):  EGD (ESOPHAGOGASTRODUODENOSCOPY) (Left)    Final Anesthesia Type: MAC    Patient location during evaluation: GI PACU  Patient participation: Yes- Able to Participate  Level of consciousness: awake and alert  Post-procedure vital signs: reviewed and stable  Pain management: adequate  Airway patency: patent    PONV status at discharge: No PONV  Anesthetic complications: no      Cardiovascular status: hemodynamically stable  Respiratory status: unassisted, spontaneous ventilation and room air  Hydration status: euvolemic  Follow-up not needed.          Vitals Value Taken Time   /76 12/4/2019 11:08 AM   Temp 36.8 °C (98.3 °F) 12/4/2019  8:35 AM   Pulse 68 12/4/2019 11:08 AM   Resp 18 12/4/2019 11:08 AM   SpO2 98 % 12/4/2019 11:08 AM         No case tracking events are documented in the log.      Pain/Yaritza Score: Yaritza Score: 10 (12/4/2019 10:58 AM)

## 2019-12-05 VITALS
DIASTOLIC BLOOD PRESSURE: 77 MMHG | BODY MASS INDEX: 22.71 KG/M2 | SYSTOLIC BLOOD PRESSURE: 142 MMHG | HEART RATE: 100 BPM | RESPIRATION RATE: 16 BRPM | WEIGHT: 186.5 LBS | HEIGHT: 76 IN | OXYGEN SATURATION: 97 % | TEMPERATURE: 98 F

## 2019-12-05 PROCEDURE — 96374 THER/PROPH/DIAG INJ IV PUSH: CPT

## 2019-12-05 PROCEDURE — 97535 SELF CARE MNGMENT TRAINING: CPT

## 2019-12-05 PROCEDURE — 97165 OT EVAL LOW COMPLEX 30 MIN: CPT

## 2019-12-05 PROCEDURE — 25000003 PHARM REV CODE 250: Performed by: INTERNAL MEDICINE

## 2019-12-05 PROCEDURE — G0378 HOSPITAL OBSERVATION PER HR: HCPCS

## 2019-12-05 PROCEDURE — 63600175 PHARM REV CODE 636 W HCPCS: Performed by: INTERNAL MEDICINE

## 2019-12-05 RX ORDER — AMLODIPINE BESYLATE 5 MG/1
5 TABLET ORAL DAILY
Status: DISCONTINUED | OUTPATIENT
Start: 2019-12-05 | End: 2019-12-05 | Stop reason: HOSPADM

## 2019-12-05 RX ADMIN — SUCRALFATE 1 G: 1 SUSPENSION ORAL at 05:12

## 2019-12-05 RX ADMIN — HYDRALAZINE HYDROCHLORIDE 10 MG: 20 INJECTION INTRAMUSCULAR; INTRAVENOUS at 06:12

## 2019-12-05 NOTE — PT/OT/SLP EVAL
Occupational Therapy   Evaluation and Discharge Note    Name: Jd Severino  MRN: [id number]  Admitting Diagnosis:  Esophageal stricture 1 Day Post-Op    Recommendations:     Discharge Recommendations: other (see comments)(D[facility] - Return to [facility])  Discharge Equipment Recommendations:  none  Barriers to discharge:  None    Assessment:     Jd Severino is a 89 y.o. male with a medical diagnosis of Esophageal stricture. At this time, patient is functioning at their prior level of function and does not require further acute OT services.     Plan:     During this hospitalization, patient does not require further acute OT services.  Please re-consult if situation changes.    · Plan of Care Reviewed with: patient    Subjective     Chief Complaint: None   Patient/Family Comments/goals: to return home.    Occupational Profile:  Living Environment: lives at [facility].  Previous level of function: Modified independent with ADLs, ambulation using straight cane  Roles and Routines: drives  Equipment Used at home:  cane, straight  Assistance upon Discharge: facility staff and family.    Pain/Comfort:  · Pain Rating 1: 0/10  · Pain Rating Post-Intervention 1: 0/10    Patients cultural, spiritual, Pentecostal conflicts given the current situation: no    Objective:     Communicated with: nurse prior to session.  Patient found sitting on commode upon entering room with peripheral IV upon OT entry to room.    General Precautions: Standard, fall   Orthopedic Precautions:N/A   Braces: N/A     Occupational Performance:    Bed Mobility:    · Did not perform, patient OOB performing standing ADL activity.     Functional Mobility/Transfers:  · Patient completed Toilet Transfer Step Transfer technique with supervision with  no AD  · Functional Mobility: ambulated 20 feet in hospital room and bathroom with supervision and no AD    Activities of Daily Living:  · Grooming: supervision washing hands standing at  sink  · Upper Body Dressing: supervision to don tshirt and buttoned up shirt sitting EOB.  · Lower Body Dressing: supervision to don socks, shoes and blue michelle pants sitting and standing EOB.  · Toileting: supervision to perform toileting and clothing management on commode.    Cognitive/Visual Perceptual:  Cognitive/Psychosocial Skills:     -       Oriented to: Person, Place, Time and Situation   -       Follows Commands/attention:Follows multistep  commands  -       Communication: clear/fluent  -       Memory: No Deficits noted  -       Safety awareness/insight to disability: intact   -       Mood/Affect/Coping skills/emotional control: Cooperative and Pleasant  Visual/Perceptual:      -Intact     Physical Exam:  Balance:    -       Sitting/Standing: Supervision  Upper Extremity Range of Motion:     -       Right Upper Extremity: WFL  -       Left Upper Extremity: WFL  Upper Extremity Strength:    -       Right Upper Extremity: WFL  -       Left Upper Extremity: WFL   Strength:    -       Right Upper Extremity: WFL  -       Left Upper Extremity: WFL  Fine Motor Coordination:    -       Intact    AMPAC 6 Click ADL:  AMPAC Total Score: 24    Treatment & Education:  Patient performed sitting/standing ADL activity, functional transfer and ambulating in the hospital room and bathroom with supervision and no safety concerns.  Education:    Patient left sitting EOB. with all lines intact, call button in reach and sister present    GOALS:   Multidisciplinary Problems     Occupational Therapy Goals     Not on file                History:     Past Medical History:   Diagnosis Date    GERD (gastroesophageal reflux disease)     History of elevated PSA     Hypertrophy of prostate with urinary obstruction and other lower urinary tract symptoms (LUTS)     Immune disorder     Retention of urine, unspecified     Vocal cord anomaly        History reviewed. No pertinent surgical history.    Time Tracking:     OT Date of  Treatment:    OT Start Time: 1022  OT Stop Time: 1045  OT Total Time (min): 23 min    Billable Minutes:Evaluation 10  Self Care/Home Management 13    Jean Yeager OT  12/5/2019

## 2019-12-05 NOTE — DISCHARGE SUMMARY
"[facility]  Discharge Summary      Patient Name: Jd Severino  MRN: [id number]  Admission Date: 12/3/2019  Discharge Date and Time: 12/5/2019 10:53 AM  Attending Physician: Yvonne Hollis MD   Discharging Provider: Yvonne Hollis MD  Primary Care Provider: Kieran Durham MD    HPI:   Patient presented to the ED with complaint of inability to manage his own oral secretions.  He was seen in the office by gastroenterologist who had scheduled him for esophageal dilation this week in the next two days  However he was not able to tolerate swallowing his own secretions and presented to the ER  He was evaluated in the ED by the gastroenterologist who recommended patient be admitted for EGD in am  The patient states [facility] because he was having pain on swallowing and could not even pass liquids  He has a history of vocal cord dysfunction which causes him to have to gasp for air; he has had this as a result of some neurologic dysfunction from many years ago.  He previously had dilation a few years ago once by dr. Ochoa  Denied choking but feels he is not sure if food went down the wrong way    ROS: a ten point ros with pertinent positives and negatives in hpi, otherwise negative    Procedure(s) (LRB):  EGD (ESOPHAGOGASTRODUODENOSCOPY) (Left)      Hospital Course:     Pt received EGD with esophogeal dilation.  Pt continued to be confused after procedure, so he was kept overnight for observation.  Pt improved back to baseline by morning and was considered stable for discharge.  Tolerating soft diet without difficulty.  Pt to follow up with GI and PCP as outpt.      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  Abd: +BS, soft, NT, ND  Ext: no atrophy or edema  Skin: warm, dry  Neuro: grossly intact  Psych: pleasant    Consults:   Consults (From admission, onward)        Status Ordering Provider     Inpatient consult to Gastroenterology  Once     Provider:  Ric Henriquez MD    Completed LAVON, GRACE        Final Active Diagnoses:    Diagnosis Date Noted POA    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    Specialty:  Internal Medicine  Why:  for hospital discharge follow-up and to follow up your blood pressure.  Contact information:  [address] [facility] [place] [address] [phone]             Ric Henriquez MD. Call today.    Specialty:  Gastroenterology  Why:  to confirm your follow-up appointment.  Contact information:  [address] [facility] [address] [phone]                 Patient Instructions:      Ambulatory Referral to Gastroenterology   Referral Priority: Routine Referral Type: Consultation   Referral Reason: Specialty Services Required   Referred to Provider: RIC HENRIQUEZ Requested Specialty: Gastroenterology   Number of Visits Requested: 1     Pending Diagnostic Studies:     Procedure Component Value Units Date/Time    EKG 12-lead [[id number]]     Order Status:  Sent Lab Status:  No result     EKG 12-lead [[id number]]     Order Status:  Sent Lab Status:  No result     Specimen to Pathology - Surgery [[id number]]  Collected:  12/04/19 1105    Order Status:  Sent Lab Status:  No result     Specimen:  Tissue          Medications:  Reconciled Home Medications:      Medication List      START taking these medications    amLODIPine 5 MG tablet  Commonly known as:  NORVASC  Take 1 tablet (5 mg total) by mouth once daily.     sucralfate 100 mg/mL suspension  Commonly known as:  CARAFATE  Take 10 mLs (1 g total) by mouth every 6 (six) hours.        CHANGE how you take these medications    omeprazole 40 MG capsule  Commonly known as:  PRILOSEC  Take 1 capsule (40 mg total) by mouth 2 (two) times daily before meals.  What changed:  when to take this     tamsulosin 0.4 mg Cap  Commonly known as:  FLOMAX  TAKE 2 CAPSULES EVERY EVENING AT NIGHT  What changed:    · how much to take  · how to take this  · when to take this        CONTINUE taking these medications    clonazePAM 0.5 MG tablet  Commonly known as:  KLONOPIN  Take 0.5 mg by mouth 2 (two) times daily as needed.     Systane Balance 0.6 % Drop  Generic drug:  propylene glycol  Apply 2 drops to eye once daily.          Yvonne Hollis MD  Department of Hospital Medicine  Pending sale to [facility]

## 2019-12-05 NOTE — PT/OT/SLP PROGRESS
Physical Therapy      Patient Name:  Jd Severino   MRN:  [id number]    Patient not seen today secondary to Other (Comment)(Pt had already been discharged and not in hospital. No eval performed. ).     Dary Hartley, PT

## 2021-10-14 ENCOUNTER — OFFICE VISIT (OUTPATIENT)
Dept: URGENT CARE | Facility: CLINIC | Age: 86
End: 2021-10-14
Payer: MEDICARE

## 2021-10-14 VITALS
DIASTOLIC BLOOD PRESSURE: 74 MMHG | OXYGEN SATURATION: 94 % | RESPIRATION RATE: 18 BRPM | HEART RATE: 95 BPM | TEMPERATURE: 98 F | SYSTOLIC BLOOD PRESSURE: 129 MMHG

## 2021-10-14 DIAGNOSIS — M25.561 ACUTE PAIN OF RIGHT KNEE: ICD-10-CM
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
## 2021-10-14 DIAGNOSIS — S93.401A SPRAIN OF RIGHT ANKLE, UNSPECIFIED LIGAMENT, INITIAL ENCOUNTER: ICD-10-CM

## 2021-10-14 PROCEDURE — 99204 PR OFFICE/OUTPT VISIT, NEW, LEVL IV, 45-59 MIN: ICD-10-PCS | Mod: S$GLB,,, | Performed by: NURSE PRACTITIONER

## 2021-10-14 PROCEDURE — 99204 OFFICE O/P NEW MOD 45 MIN: CPT | Mod: S$GLB,,, | Performed by: NURSE PRACTITIONER

## 2023-01-24 ENCOUNTER — OFFICE VISIT (OUTPATIENT)
Dept: URGENT CARE | Facility: CLINIC | Age: 88
End: 2023-01-24
Payer: MEDICARE

## 2023-01-24 VITALS — HEART RATE: 85 BPM | DIASTOLIC BLOOD PRESSURE: 74 MMHG | OXYGEN SATURATION: 95 % | SYSTOLIC BLOOD PRESSURE: 116 MMHG

## 2023-01-24 DIAGNOSIS — R09.81 NASAL CONGESTION: ICD-10-CM

## 2023-01-24 DIAGNOSIS — R05.1 ACUTE COUGH: ICD-10-CM

## 2023-01-24 DIAGNOSIS — J06.9 UPPER RESPIRATORY TRACT INFECTION, UNSPECIFIED TYPE: Primary | ICD-10-CM

## 2023-01-24 LAB
CTP QC/QA: YES
FLUAV AG NPH QL: NEGATIVE
FLUBV AG NPH QL: NEGATIVE

## 2023-01-24 PROCEDURE — 87804 POCT INFLUENZA A/B: ICD-10-PCS | Mod: 59,QW,, | Performed by: NURSE PRACTITIONER

## 2023-01-24 PROCEDURE — 87804 INFLUENZA ASSAY W/OPTIC: CPT | Mod: 59,QW,, | Performed by: NURSE PRACTITIONER

## 2023-01-24 PROCEDURE — 99213 PR OFFICE/OUTPT VISIT, EST, LEVL III, 20-29 MIN: ICD-10-PCS | Mod: S$GLB,,, | Performed by: NURSE PRACTITIONER

## 2023-01-24 PROCEDURE — 99213 OFFICE O/P EST LOW 20 MIN: CPT | Mod: S$GLB,,, | Performed by: NURSE PRACTITIONER

## 2023-01-24 RX ORDER — GUAIFENESIN AND DEXTROMETHORPHAN HYDROBROMIDE 600; 30 MG/1; MG/1
1 TABLET, EXTENDED RELEASE ORAL 2 TIMES DAILY
Qty: 20 TABLET | Refills: 0 | Status: SHIPPED | OUTPATIENT
Start: 2023-01-24 | End: 2023-02-03

## 2023-01-24 RX ORDER — DOXYCYCLINE 100 MG/1
100 CAPSULE ORAL EVERY 12 HOURS
Qty: 20 CAPSULE | Refills: 0 | Status: SHIPPED | OUTPATIENT
Start: 2023-01-24 | End: 2023-02-03

## 2023-01-24 NOTE — PROGRESS NOTES
Subjective:       Patient ID: Jd Severino is a 89 y.o. male.    Vitals:  blood pressure is 116/74 and his pulse is 85. His oxygen saturation is 95%.     Chief Complaint: Cough and Nasal Congestion    Jd Severino presents to clinic with cough, nasal congestion that started yesterday.    Cough  This is a new problem. The current episode started yesterday. The cough is Non-productive. Associated symptoms include nasal congestion and rhinorrhea.     Constitution: Negative.   HENT:  Positive for congestion.    Neck: neck negative.   Cardiovascular: Negative.    Eyes: Negative.    Respiratory:  Positive for cough.    Gastrointestinal: Negative.    Endocrine: negative.   Genitourinary: Negative.    Musculoskeletal: Negative.    Skin: Negative.      Objective:      Physical Exam   Constitutional: He is oriented to person, place, and time. He appears well-developed. He is cooperative.  Non-toxic appearance. He does not appear ill. No distress.   HENT:   Head: Normocephalic and atraumatic.   Ears:   Right Ear: Hearing, tympanic membrane, external ear and ear canal normal.   Left Ear: Hearing, tympanic membrane, external ear and ear canal normal.   Nose: Nose normal. No mucosal edema, rhinorrhea or nasal deformity. No epistaxis. Right sinus exhibits no maxillary sinus tenderness and no frontal sinus tenderness. Left sinus exhibits no maxillary sinus tenderness and no frontal sinus tenderness.   Mouth/Throat: Uvula is midline and mucous membranes are normal. No trismus in the jaw. Normal dentition. No uvula swelling. Posterior oropharyngeal erythema present. No oropharyngeal exudate or posterior oropharyngeal edema.   Eyes: Conjunctivae and lids are normal. No scleral icterus.   Neck: Trachea normal and phonation normal. Neck supple. No edema present. No erythema present. No neck rigidity present.   Cardiovascular: Normal rate, regular rhythm, normal heart sounds and normal pulses.   Pulmonary/Chest: Effort  normal. No respiratory distress. He has no decreased breath sounds. He has rhonchi in the right upper field, the right middle field and the left upper field.   Abdominal: Normal appearance.   Musculoskeletal: Normal range of motion.         General: No deformity. Normal range of motion.   Neurological: He is alert and oriented to person, place, and time. He exhibits normal muscle tone. Coordination normal.   Skin: Skin is warm, dry, intact, not diaphoretic and not pale.   Psychiatric: His speech is normal and behavior is normal. Judgment and thought content normal.   Nursing note and vitals reviewed.      Assessment:       1. Upper respiratory tract infection, unspecified type    2. Acute cough    3. Nasal congestion          Plan:         Upper respiratory tract infection, unspecified type  -     doxycycline (VIBRAMYCIN) 100 MG Cap; Take 1 capsule (100 mg total) by mouth every 12 (twelve) hours. for 10 days  Dispense: 20 capsule; Refill: 0  -     dextromethorphan-guaiFENesin  mg (MUCINEX DM)  mg per 12 hr tablet; Take 1 tablet by mouth 2 (two) times daily. for 10 days  Dispense: 20 tablet; Refill: 0    Acute cough  -     POCT Influenza A/B  -     doxycycline (VIBRAMYCIN) 100 MG Cap; Take 1 capsule (100 mg total) by mouth every 12 (twelve) hours. for 10 days  Dispense: 20 capsule; Refill: 0  -     dextromethorphan-guaiFENesin  mg (MUCINEX DM)  mg per 12 hr tablet; Take 1 tablet by mouth 2 (two) times daily. for 10 days  Dispense: 20 tablet; Refill: 0    Nasal congestion  -     doxycycline (VIBRAMYCIN) 100 MG Cap; Take 1 capsule (100 mg total) by mouth every 12 (twelve) hours. for 10 days  Dispense: 20 capsule; Refill: 0  -     dextromethorphan-guaiFENesin  mg (MUCINEX DM)  mg per 12 hr tablet; Take 1 tablet by mouth 2 (two) times daily. for 10 days  Dispense: 20 tablet; Refill: 0